# Patient Record
Sex: FEMALE | Race: WHITE | URBAN - METROPOLITAN AREA
[De-identification: names, ages, dates, MRNs, and addresses within clinical notes are randomized per-mention and may not be internally consistent; named-entity substitution may affect disease eponyms.]

---

## 2020-12-18 ENCOUNTER — IMPORTED ENCOUNTER (OUTPATIENT)
Dept: URBAN - METROPOLITAN AREA CLINIC 50 | Facility: CLINIC | Age: 60
End: 2020-12-18

## 2020-12-18 NOTE — PATIENT DISCUSSION
"""Discussed blepharitis diagnosis with patient. Educated patient on proper lid hygiene and the use of warm compresses.  "" ""Start Preservative free tears both eyes TID-QID

## 2021-01-14 ENCOUNTER — IMPORTED ENCOUNTER (OUTPATIENT)
Dept: URBAN - METROPOLITAN AREA CLINIC 50 | Facility: CLINIC | Age: 61
End: 2021-01-14

## 2021-01-14 NOTE — PATIENT DISCUSSION
"""Discussed with patient the potential of poor adaptation to monovision as well as chance of decreased ""

## 2021-04-17 ASSESSMENT — VISUAL ACUITY
OD_OTHER: 20/20. 20/20.
OS_CC: 20/20
OS_OTHER: 20/20. 20/20.
OS_BAT: 20/20
OD_CC: J1+@ 16 IN
OS_CC: J1+@ 16 IN
OD_BAT: 20/20
OD_CC: 20/20

## 2021-04-17 ASSESSMENT — TONOMETRY
OD_IOP_MMHG: 11
OS_IOP_MMHG: 11

## 2021-12-15 ENCOUNTER — PREPPED CHART (OUTPATIENT)
Dept: URBAN - METROPOLITAN AREA CLINIC 49 | Facility: CLINIC | Age: 61
End: 2021-12-15

## 2022-02-02 ENCOUNTER — COMPREHENSIVE EXAM (OUTPATIENT)
Dept: URBAN - METROPOLITAN AREA CLINIC 53 | Facility: CLINIC | Age: 62
End: 2022-02-02

## 2022-02-02 DIAGNOSIS — H25.13: ICD-10-CM

## 2022-02-02 DIAGNOSIS — H52.4: ICD-10-CM

## 2022-02-02 DIAGNOSIS — Z97.3: ICD-10-CM

## 2022-02-02 PROCEDURE — 92014 COMPRE OPH EXAM EST PT 1/>: CPT

## 2022-02-02 ASSESSMENT — TONOMETRY
OS_IOP_MMHG: 16
OD_IOP_MMHG: 16

## 2022-02-02 ASSESSMENT — VISUAL ACUITY
OD_CC: 20/20
OS_CC: J1+ PUSH @16INCHES
OS_CC: 20/20
OD_CC: J1+@16INCHES
OU_CC: J1+ @16INCHES

## 2022-02-02 ASSESSMENT — KERATOMETRY
OD_AXISANGLE_DEGREES: 139
OD_AXISANGLE2_DEGREES: 49
OD_K1POWER_DIOPTERS: 42.00
OS_AXISANGLE2_DEGREES: 90
OS_K1POWER_DIOPTERS: 42.25
OS_K2POWER_DIOPTERS: 42.25
OS_AXISANGLE_DEGREES: 180
OD_K2POWER_DIOPTERS: 42.25

## 2022-02-02 NOTE — PATIENT DISCUSSION
Discussed proper contact lens care and hygiene. Discussed risks of infection and permanent vision loss with over wearing or sleeping in lenses. Discussed signs and symptoms of CL related infection and return to clinic immediately if experienced.

## 2022-02-02 NOTE — PATIENT DISCUSSION
Patient given trial for OS, Acuvue Oasys with HydraLuxe 8.5, 14.3 +3.75. Patient will schedule CL fitting at her convenience. If patient prefers trial, patient can call to finalize.

## 2022-02-21 ENCOUNTER — CONTACT LENSES/GLASSES VISIT (OUTPATIENT)
Dept: URBAN - METROPOLITAN AREA CLINIC 53 | Facility: CLINIC | Age: 62
End: 2022-02-21

## 2022-02-21 DIAGNOSIS — Z97.3: ICD-10-CM

## 2022-02-21 DIAGNOSIS — H52.4: ICD-10-CM

## 2022-02-21 PROCEDURE — 92310-1E ESTABLISHED CL PATIENT SPHERICAL SINGLE VISION SOFT LENS EVALUATION

## 2022-02-21 ASSESSMENT — KERATOMETRY
OD_K2POWER_DIOPTERS: 42.25
OD_AXISANGLE_DEGREES: 139
OS_K1POWER_DIOPTERS: 42.25
OS_AXISANGLE2_DEGREES: 90
OD_K1POWER_DIOPTERS: 42.00
OS_AXISANGLE_DEGREES: 180
OS_K2POWER_DIOPTERS: 42.25
OD_AXISANGLE2_DEGREES: 49

## 2022-02-21 ASSESSMENT — VISUAL ACUITY
OU_CC: 20/20
OS_CC: 20/100
OD_CC: 20/20
OU_CC: J1

## 2024-02-14 ENCOUNTER — COMPREHENSIVE EXAM (OUTPATIENT)
Dept: URBAN - METROPOLITAN AREA CLINIC 53 | Facility: CLINIC | Age: 64
End: 2024-02-14

## 2024-02-14 DIAGNOSIS — H25.13: ICD-10-CM

## 2024-02-14 DIAGNOSIS — H01.00A: ICD-10-CM

## 2024-02-14 DIAGNOSIS — H52.03: ICD-10-CM

## 2024-02-14 DIAGNOSIS — Z97.3: ICD-10-CM

## 2024-02-14 DIAGNOSIS — H52.4: ICD-10-CM

## 2024-02-14 PROCEDURE — 92310-3E ESTABLISHED CL PATIENT MULTIFOCAL AND/OR MONOVISION SOFT LENS EVALUATION

## 2024-02-14 PROCEDURE — 99214 OFFICE O/P EST MOD 30 MIN: CPT

## 2024-02-14 PROCEDURE — 92015 DETERMINE REFRACTIVE STATE: CPT

## 2024-02-14 ASSESSMENT — KERATOMETRY
OS_K1POWER_DIOPTERS: 42.25
OD_K2POWER_DIOPTERS: 42.00
OD_K2POWER_DIOPTERS: 42.25
OD_K1POWER_DIOPTERS: 41.75
OS_AXISANGLE_DEGREES: 180
OS_K2POWER_DIOPTERS: 42.25
OD_AXISANGLE_DEGREES: 158
OS_AXISANGLE2_DEGREES: 90
OS_AXISANGLE2_DEGREES: 27
OD_AXISANGLE_DEGREES: 139
OS_AXISANGLE_DEGREES: 117
OD_AXISANGLE2_DEGREES: 49
OS_K1POWER_DIOPTERS: 42.00
OD_K1POWER_DIOPTERS: 42.00
OD_AXISANGLE2_DEGREES: 68

## 2024-02-14 ASSESSMENT — VISUAL ACUITY
OS_CC: J5
OU_CC: J2
OS_CC: 20/30
OD_CC: 20/25-2
OS_GLARE: 20/30
OD_CC: 20/30
OD_GLARE: 20/30
OD_GLARE: 20/40
OS_GLARE: 20/30
OS_PH: 20/20

## 2024-02-14 ASSESSMENT — TONOMETRY
OD_IOP_MMHG: 15
OS_IOP_MMHG: 15

## 2025-08-11 ENCOUNTER — COMPREHENSIVE EXAM (OUTPATIENT)
Age: 65
End: 2025-08-11

## 2025-08-11 DIAGNOSIS — H52.03: ICD-10-CM

## 2025-08-11 DIAGNOSIS — Z97.3: ICD-10-CM

## 2025-08-11 DIAGNOSIS — H01.02B: ICD-10-CM

## 2025-08-11 DIAGNOSIS — H25.13: ICD-10-CM

## 2025-08-11 DIAGNOSIS — H52.4: ICD-10-CM

## 2025-08-11 DIAGNOSIS — H01.02A: ICD-10-CM

## 2025-08-11 PROCEDURE — 92310-2 LEVEL 2 SOFT LENS UPDATE

## 2025-08-11 PROCEDURE — 92014 COMPRE OPH EXAM EST PT 1/>: CPT

## 2025-08-11 PROCEDURE — 92015 DETERMINE REFRACTIVE STATE: CPT
